# Patient Record
Sex: MALE | Race: WHITE
[De-identification: names, ages, dates, MRNs, and addresses within clinical notes are randomized per-mention and may not be internally consistent; named-entity substitution may affect disease eponyms.]

---

## 2019-03-26 ENCOUNTER — HOSPITAL ENCOUNTER (EMERGENCY)
Dept: HOSPITAL 53 - M ED | Age: 24
Discharge: HOME | End: 2019-03-26
Payer: SELF-PAY

## 2019-03-26 VITALS — SYSTOLIC BLOOD PRESSURE: 142 MMHG | DIASTOLIC BLOOD PRESSURE: 62 MMHG

## 2019-03-26 VITALS — HEIGHT: 67 IN | BODY MASS INDEX: 49.44 KG/M2 | WEIGHT: 315 LBS

## 2019-03-26 DIAGNOSIS — F17.210: ICD-10-CM

## 2019-03-26 DIAGNOSIS — L05.01: Primary | ICD-10-CM

## 2020-01-28 ENCOUNTER — HOSPITAL ENCOUNTER (EMERGENCY)
Dept: HOSPITAL 53 - M ED | Age: 25
Discharge: HOME | End: 2020-01-28
Payer: SELF-PAY

## 2020-01-28 VITALS — DIASTOLIC BLOOD PRESSURE: 79 MMHG | SYSTOLIC BLOOD PRESSURE: 134 MMHG

## 2020-01-28 VITALS — WEIGHT: 285.61 LBS | BODY MASS INDEX: 44.83 KG/M2 | HEIGHT: 67 IN

## 2020-01-28 DIAGNOSIS — F17.200: ICD-10-CM

## 2020-01-28 DIAGNOSIS — M79.671: Primary | ICD-10-CM

## 2020-01-28 DIAGNOSIS — F12.90: ICD-10-CM

## 2020-01-28 DIAGNOSIS — R22.41: ICD-10-CM

## 2020-01-29 NOTE — REP
Clinical: Pain and swelling

 

Technique:  AP, lateral, bilateral oblique views right foot .

 

Findings:  The osseous structures and joint spaces are intact and normal.  There

is no evidence for acute fracture or dislocation.  Surrounding soft tissues are

unremarkable.  No subcutaneous emphysema or radiodense foreign body.

 

Impression:

Normal right foot series.  No acute fracture or dislocation.

 

 

Electronically Signed by

Colby Horowitz MD 01/29/2020 02:04 A

## 2021-06-18 ENCOUNTER — HOSPITAL ENCOUNTER (EMERGENCY)
Dept: HOSPITAL 53 - M ED | Age: 26
LOS: 1 days | Discharge: HOME | End: 2021-06-19
Payer: SELF-PAY

## 2021-06-18 VITALS — BODY MASS INDEX: 47.85 KG/M2 | WEIGHT: 304.9 LBS | HEIGHT: 67 IN

## 2021-06-18 DIAGNOSIS — F17.210: ICD-10-CM

## 2021-06-18 DIAGNOSIS — N20.1: Primary | ICD-10-CM

## 2021-06-18 DIAGNOSIS — F12.20: ICD-10-CM

## 2021-06-18 LAB
ALBUMIN SERPL BCG-MCNC: 3.8 GM/DL (ref 3.2–5.2)
ALT SERPL W P-5'-P-CCNC: 35 U/L (ref 12–78)
BASOPHILS # BLD AUTO: 0.1 10^3/UL (ref 0–0.2)
BASOPHILS NFR BLD AUTO: 0.3 % (ref 0–1)
BILIRUB CONJ SERPL-MCNC: 0.2 MG/DL (ref 0–0.2)
BILIRUB SERPL-MCNC: 0.7 MG/DL (ref 0.2–1)
BUN SERPL-MCNC: 16 MG/DL (ref 7–18)
CALCIUM SERPL-MCNC: 9 MG/DL (ref 8.5–10.1)
CHLORIDE SERPL-SCNC: 111 MEQ/L (ref 98–107)
CO2 SERPL-SCNC: 25 MEQ/L (ref 21–32)
CREAT SERPL-MCNC: 1.4 MG/DL (ref 0.7–1.3)
EOSINOPHIL # BLD AUTO: 0.1 10^3/UL (ref 0–0.5)
EOSINOPHIL NFR BLD AUTO: 0.2 % (ref 0–3)
GFR SERPL CREATININE-BSD FRML MDRD: > 60 ML/MIN/{1.73_M2} (ref 60–?)
GLUCOSE SERPL-MCNC: 95 MG/DL (ref 70–100)
HCT VFR BLD AUTO: 47.5 % (ref 42–52)
HGB BLD-MCNC: 16.2 G/DL (ref 13.5–17.5)
LIPASE SERPL-CCNC: 50 U/L (ref 73–393)
LYMPHOCYTES # BLD AUTO: 1.6 10^3/UL (ref 1.5–5)
LYMPHOCYTES NFR BLD AUTO: 6.9 % (ref 24–44)
MCH RBC QN AUTO: 31.3 PG (ref 27–33)
MCHC RBC AUTO-ENTMCNC: 34.1 G/DL (ref 32–36.5)
MCV RBC AUTO: 91.9 FL (ref 80–96)
MONOCYTES # BLD AUTO: 0.8 10^3/UL (ref 0–0.8)
MONOCYTES NFR BLD AUTO: 3.6 % (ref 2–8)
NEUTROPHILS # BLD AUTO: 20.6 10^3/UL (ref 1.5–8.5)
NEUTROPHILS NFR BLD AUTO: 88.5 % (ref 36–66)
PLATELET # BLD AUTO: 406 10^3/UL (ref 150–450)
POTASSIUM SERPL-SCNC: 4 MEQ/L (ref 3.5–5.1)
PROT SERPL-MCNC: 7.8 GM/DL (ref 6.4–8.2)
RBC # BLD AUTO: 5.17 10^6/UL (ref 4.3–6.1)
SODIUM SERPL-SCNC: 141 MEQ/L (ref 136–145)
WBC # BLD AUTO: 23.2 10^3/UL (ref 4–10)

## 2021-06-18 PROCEDURE — 99284 EMERGENCY DEPT VISIT MOD MDM: CPT

## 2021-06-18 PROCEDURE — 96375 TX/PRO/DX INJ NEW DRUG ADDON: CPT

## 2021-06-18 PROCEDURE — 80048 BASIC METABOLIC PNL TOTAL CA: CPT

## 2021-06-18 PROCEDURE — 80076 HEPATIC FUNCTION PANEL: CPT

## 2021-06-18 PROCEDURE — 81001 URINALYSIS AUTO W/SCOPE: CPT

## 2021-06-18 PROCEDURE — 96361 HYDRATE IV INFUSION ADD-ON: CPT

## 2021-06-18 PROCEDURE — 96374 THER/PROPH/DIAG INJ IV PUSH: CPT

## 2021-06-18 PROCEDURE — 74177 CT ABD & PELVIS W/CONTRAST: CPT

## 2021-06-18 PROCEDURE — 85025 COMPLETE CBC W/AUTO DIFF WBC: CPT

## 2021-06-18 PROCEDURE — 83690 ASSAY OF LIPASE: CPT

## 2021-06-18 PROCEDURE — 83605 ASSAY OF LACTIC ACID: CPT

## 2021-06-18 NOTE — REPVR
PROCEDURE INFORMATION: 



Exam: CT Abdomen and Pelvis with Contrast 

Exam date and time:  6/18/21  (10:28pm)

Age: 26 years old 

Clinical indication:  LLQ pain and vomiting.  Evaluate for pancreatitis and 

diverticulitis.  



TECHNIQUE: 

Imaging protocol: Computed tomography of the abdomen and pelvis with contrast. 

Radiation optimization: All CT scans at this facility use at least one of these 

dose optimization techniques: automated exposure control; mA and/or kV 

adjustment per patient size (includes targeted exams where dose is matched to 

clinical indication); or iterative reconstruction. 

Contrast material:  Isovue 370  Contrast volume: 100 ml  Contrast route:  IV



COMPARISON: 

No previous pertinent studies for comparison



FINDINGS: 

Liver:  No solid mass.  Diffuse fatty infiltration.

Gallbladder and bile ducts: Normal. No calcified stones. No ductal dilatation. 

Pancreas: Normal. No ductal dilatation. 

Spleen: Normal. No splenomegaly. 

Adrenal glands: Normal. No mass. 

Kidneys and ureters:  Mild left hydronephrosis.  Punctate obstructing stone 

(1-2 mm size) at the left UV junction.

Stomach and bowel: Unremarkable. No bowel obstruction. No mucosal thickening. 

Appendix:  A normal appendix is visualized.   

Other findings:  Very small uncomplicated fat-containing periumbilical hernia 

(8 mm size).   



Intraperitoneal space: Unremarkable. No free air. No significant fluid 

collection. 

Vasculature: Unremarkable. No abdominal aortic aneurysm. 

Lymph nodes: Unremarkable. No enlarged lymph nodes. 



Urinary bladder: Unremarkable as visualized. 

Reproductive: Unremarkable as visualized. 



Bones/joints: Unremarkable. No acute fracture. 

Soft tissues: Unremarkable. 



IMPRESSION: 

Mild left hydronephrosis.  

    Punctate obstructing stone (1-2 mm size) at the left UV junction.



No acute bowel pathology.  No evidence of diverticulitis.

Normal appearance of the pancreas.



Electronically signed by: Rachel Black On 06/18/2021  23:19:24 PM

## 2021-06-19 VITALS — DIASTOLIC BLOOD PRESSURE: 72 MMHG | SYSTOLIC BLOOD PRESSURE: 138 MMHG

## 2021-12-30 ENCOUNTER — HOSPITAL ENCOUNTER (OUTPATIENT)
Dept: HOSPITAL 53 - M RAD | Age: 26
End: 2021-12-30
Attending: PHYSICIAN ASSISTANT
Payer: MEDICAID
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
## 2021-12-30 DIAGNOSIS — S62.316A: Primary | ICD-10-CM
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
## 2021-12-30 NOTE — REP
INDICATION:

HURT HAND



COMPARISON:

None.



TECHNIQUE:

AP, lateral, bilateral oblique views right hand.



FINDINGS:

Acute boxer's fracture at the head of the 5th metacarpal bone with volar angulation

and overlying soft tissue swelling.



IMPRESSION:

Acute boxer's fracture at the 5th metacarpal head.





<Electronically signed by Colby Horowitz > 12/30/21 [id number]